# Patient Record
Sex: MALE | Race: WHITE | NOT HISPANIC OR LATINO | URBAN - METROPOLITAN AREA
[De-identification: names, ages, dates, MRNs, and addresses within clinical notes are randomized per-mention and may not be internally consistent; named-entity substitution may affect disease eponyms.]

---

## 2018-02-02 ENCOUNTER — INPATIENT (INPATIENT)
Facility: HOSPITAL | Age: 83
LOS: 6 days | Discharge: NURSING, REHAB OR LONG-TERM CARE | End: 2018-02-09
Attending: INTERNAL MEDICINE | Admitting: INTERNAL MEDICINE
Payer: MEDICARE

## 2018-02-02 VITALS
TEMPERATURE: 98 F | DIASTOLIC BLOOD PRESSURE: 101 MMHG | RESPIRATION RATE: 16 BRPM | OXYGEN SATURATION: 98 % | WEIGHT: 160.06 LBS | HEIGHT: 66 IN | HEART RATE: 110 BPM | SYSTOLIC BLOOD PRESSURE: 161 MMHG

## 2018-02-02 DIAGNOSIS — J69.0 PNEUMONITIS DUE TO INHALATION OF FOOD AND VOMIT: ICD-10-CM

## 2018-02-02 DIAGNOSIS — Z98.49 CATARACT EXTRACTION STATUS, UNSPECIFIED EYE: Chronic | ICD-10-CM

## 2018-02-02 DIAGNOSIS — Z86.73 PERSONAL HISTORY OF TRANSIENT ISCHEMIC ATTACK (TIA), AND CEREBRAL INFARCTION WITHOUT RESIDUAL DEFICITS: ICD-10-CM

## 2018-02-02 DIAGNOSIS — R09.02 HYPOXEMIA: ICD-10-CM

## 2018-02-02 DIAGNOSIS — I95.9 HYPOTENSION, UNSPECIFIED: ICD-10-CM

## 2018-02-02 DIAGNOSIS — H26.9 UNSPECIFIED CATARACT: ICD-10-CM

## 2018-02-02 DIAGNOSIS — G62.9 POLYNEUROPATHY, UNSPECIFIED: ICD-10-CM

## 2018-02-02 DIAGNOSIS — J96.01 ACUTE RESPIRATORY FAILURE WITH HYPOXIA: ICD-10-CM

## 2018-02-02 DIAGNOSIS — R04.0 EPISTAXIS: ICD-10-CM

## 2018-02-02 DIAGNOSIS — R09.89 OTHER SPECIFIED SYMPTOMS AND SIGNS INVOLVING THE CIRCULATORY AND RESPIRATORY SYSTEMS: ICD-10-CM

## 2018-02-02 DIAGNOSIS — M48.00 SPINAL STENOSIS, SITE UNSPECIFIED: ICD-10-CM

## 2018-02-02 DIAGNOSIS — I48.2 CHRONIC ATRIAL FIBRILLATION: ICD-10-CM

## 2018-02-02 DIAGNOSIS — R06.82 TACHYPNEA, NOT ELSEWHERE CLASSIFIED: ICD-10-CM

## 2018-02-02 DIAGNOSIS — J18.9 PNEUMONIA, UNSPECIFIED ORGANISM: ICD-10-CM

## 2018-02-02 DIAGNOSIS — R06.00 DYSPNEA, UNSPECIFIED: ICD-10-CM

## 2018-02-02 DIAGNOSIS — Z79.01 LONG TERM (CURRENT) USE OF ANTICOAGULANTS: ICD-10-CM

## 2018-02-02 DIAGNOSIS — I10 ESSENTIAL (PRIMARY) HYPERTENSION: ICD-10-CM

## 2018-02-02 DIAGNOSIS — Z87.891 PERSONAL HISTORY OF NICOTINE DEPENDENCE: ICD-10-CM

## 2018-02-02 DIAGNOSIS — R78.89 FINDING OF OTHER SPECIFIED SUBSTANCES, NOT NORMALLY FOUND IN BLOOD: ICD-10-CM

## 2018-02-02 DIAGNOSIS — D68.9 COAGULATION DEFECT, UNSPECIFIED: ICD-10-CM

## 2018-02-02 LAB
ALBUMIN SERPL ELPH-MCNC: 3.4 G/DL — SIGNIFICANT CHANGE UP (ref 3.3–5)
ALP SERPL-CCNC: 89 U/L — SIGNIFICANT CHANGE UP (ref 40–120)
ALT FLD-CCNC: 17 U/L — SIGNIFICANT CHANGE UP (ref 12–78)
ANION GAP SERPL CALC-SCNC: 11 MMOL/L — SIGNIFICANT CHANGE UP (ref 5–17)
APTT BLD: 46.9 SEC — HIGH (ref 27.5–37.4)
AST SERPL-CCNC: 15 U/L — SIGNIFICANT CHANGE UP (ref 15–37)
BASE EXCESS BLDA CALC-SCNC: -2.3 MMOL/L — LOW (ref -2–2)
BASOPHILS # BLD AUTO: 0.07 K/UL — SIGNIFICANT CHANGE UP (ref 0–0.2)
BASOPHILS NFR BLD AUTO: 0.8 % — SIGNIFICANT CHANGE UP (ref 0–2)
BILIRUB SERPL-MCNC: 0.5 MG/DL — SIGNIFICANT CHANGE UP (ref 0.2–1.2)
BLD GP AB SCN SERPL QL: SIGNIFICANT CHANGE UP
BLOOD GAS COMMENTS: SIGNIFICANT CHANGE UP
BLOOD GAS COMMENTS: SIGNIFICANT CHANGE UP
BLOOD GAS SOURCE: SIGNIFICANT CHANGE UP
BUN SERPL-MCNC: 19 MG/DL — SIGNIFICANT CHANGE UP (ref 7–23)
CALCIUM SERPL-MCNC: 8.9 MG/DL — SIGNIFICANT CHANGE UP (ref 8.5–10.1)
CHLORIDE SERPL-SCNC: 103 MMOL/L — SIGNIFICANT CHANGE UP (ref 96–108)
CO2 SERPL-SCNC: 26 MMOL/L — SIGNIFICANT CHANGE UP (ref 22–31)
CREAT SERPL-MCNC: 1.31 MG/DL — HIGH (ref 0.5–1.3)
EOSINOPHIL # BLD AUTO: 0.19 K/UL — SIGNIFICANT CHANGE UP (ref 0–0.5)
EOSINOPHIL NFR BLD AUTO: 2.1 % — SIGNIFICANT CHANGE UP (ref 0–6)
FLUAV SPEC QL CULT: NEGATIVE — SIGNIFICANT CHANGE UP
FLUBV AG SPEC QL IA: NEGATIVE — SIGNIFICANT CHANGE UP
GLUCOSE SERPL-MCNC: 199 MG/DL — HIGH (ref 70–99)
GRAM STN FLD: SIGNIFICANT CHANGE UP
HCO3 BLDA-SCNC: 21 MMOL/L — SIGNIFICANT CHANGE UP (ref 21–29)
HCT VFR BLD CALC: 49.7 % — SIGNIFICANT CHANGE UP (ref 39–50)
HCT VFR BLD CALC: 52.5 % — HIGH (ref 39–50)
HGB BLD-MCNC: 16.1 G/DL — SIGNIFICANT CHANGE UP (ref 13–17)
HGB BLD-MCNC: 16.9 G/DL — SIGNIFICANT CHANGE UP (ref 13–17)
HOROWITZ INDEX BLDA+IHG-RTO: 100 — SIGNIFICANT CHANGE UP
IMM GRANULOCYTES NFR BLD AUTO: 0.5 % — SIGNIFICANT CHANGE UP (ref 0–1.5)
INR BLD: 3.96 RATIO — HIGH (ref 0.88–1.16)
INR BLD: 4 RATIO — HIGH (ref 0.88–1.16)
LEGIONELLA AG UR QL: NEGATIVE — SIGNIFICANT CHANGE UP
LYMPHOCYTES # BLD AUTO: 1.73 K/UL — SIGNIFICANT CHANGE UP (ref 1–3.3)
LYMPHOCYTES # BLD AUTO: 19 % — SIGNIFICANT CHANGE UP (ref 13–44)
MCHC RBC-ENTMCNC: 31 PG — SIGNIFICANT CHANGE UP (ref 27–34)
MCHC RBC-ENTMCNC: 31.1 PG — SIGNIFICANT CHANGE UP (ref 27–34)
MCHC RBC-ENTMCNC: 32.2 GM/DL — SIGNIFICANT CHANGE UP (ref 32–36)
MCHC RBC-ENTMCNC: 32.4 GM/DL — SIGNIFICANT CHANGE UP (ref 32–36)
MCV RBC AUTO: 96.1 FL — SIGNIFICANT CHANGE UP (ref 80–100)
MCV RBC AUTO: 96.2 FL — SIGNIFICANT CHANGE UP (ref 80–100)
MONOCYTES # BLD AUTO: 0.66 K/UL — SIGNIFICANT CHANGE UP (ref 0–0.9)
MONOCYTES NFR BLD AUTO: 7.2 % — SIGNIFICANT CHANGE UP (ref 2–14)
NEUTROPHILS # BLD AUTO: 6.41 K/UL — SIGNIFICANT CHANGE UP (ref 1.8–7.4)
NEUTROPHILS NFR BLD AUTO: 70.4 % — SIGNIFICANT CHANGE UP (ref 43–77)
NRBC # BLD: 0 /100 WBCS — SIGNIFICANT CHANGE UP (ref 0–0)
NRBC # BLD: 0 /100 WBCS — SIGNIFICANT CHANGE UP (ref 0–0)
PCO2 BLDA: 35 MMHG — SIGNIFICANT CHANGE UP (ref 32–46)
PH BLD: 7.4 — SIGNIFICANT CHANGE UP (ref 7.35–7.45)
PLATELET # BLD AUTO: 319 K/UL — SIGNIFICANT CHANGE UP (ref 150–400)
PLATELET # BLD AUTO: 373 K/UL — SIGNIFICANT CHANGE UP (ref 150–400)
PO2 BLDA: 68 MMHG — LOW (ref 74–108)
POTASSIUM SERPL-MCNC: 4.4 MMOL/L — SIGNIFICANT CHANGE UP (ref 3.5–5.3)
POTASSIUM SERPL-SCNC: 4.4 MMOL/L — SIGNIFICANT CHANGE UP (ref 3.5–5.3)
PROT SERPL-MCNC: 8.7 GM/DL — HIGH (ref 6–8.3)
PROTHROM AB SERPL-ACNC: 44.4 SEC — HIGH (ref 9.8–12.7)
PROTHROM AB SERPL-ACNC: 44.8 SEC — HIGH (ref 9.8–12.7)
RBC # BLD: 5.17 M/UL — SIGNIFICANT CHANGE UP (ref 4.2–5.8)
RBC # BLD: 5.46 M/UL — SIGNIFICANT CHANGE UP (ref 4.2–5.8)
RBC # FLD: 13 % — SIGNIFICANT CHANGE UP (ref 10.3–14.5)
RBC # FLD: 13.1 % — SIGNIFICANT CHANGE UP (ref 10.3–14.5)
SAO2 % BLDA: 92 % — SIGNIFICANT CHANGE UP (ref 92–96)
SODIUM SERPL-SCNC: 140 MMOL/L — SIGNIFICANT CHANGE UP (ref 135–145)
SPECIMEN SOURCE: SIGNIFICANT CHANGE UP
WBC # BLD: 11.53 K/UL — HIGH (ref 3.8–10.5)
WBC # BLD: 9.11 K/UL — SIGNIFICANT CHANGE UP (ref 3.8–10.5)
WBC # FLD AUTO: 11.53 K/UL — HIGH (ref 3.8–10.5)
WBC # FLD AUTO: 9.11 K/UL — SIGNIFICANT CHANGE UP (ref 3.8–10.5)

## 2018-02-02 PROCEDURE — 93010 ELECTROCARDIOGRAM REPORT: CPT

## 2018-02-02 PROCEDURE — 99285 EMERGENCY DEPT VISIT HI MDM: CPT

## 2018-02-02 PROCEDURE — 99291 CRITICAL CARE FIRST HOUR: CPT

## 2018-02-02 PROCEDURE — 71045 X-RAY EXAM CHEST 1 VIEW: CPT | Mod: 26

## 2018-02-02 RX ORDER — METOPROLOL TARTRATE 50 MG
25 TABLET ORAL ONCE
Qty: 0 | Refills: 0 | Status: COMPLETED | OUTPATIENT
Start: 2018-02-02 | End: 2018-02-02

## 2018-02-02 RX ORDER — PIPERACILLIN AND TAZOBACTAM 4; .5 G/20ML; G/20ML
3.38 INJECTION, POWDER, LYOPHILIZED, FOR SOLUTION INTRAVENOUS EVERY 8 HOURS
Qty: 0 | Refills: 0 | Status: DISCONTINUED | OUTPATIENT
Start: 2018-02-02 | End: 2018-02-03

## 2018-02-02 RX ORDER — AZITHROMYCIN 500 MG/1
500 TABLET, FILM COATED ORAL ONCE
Qty: 0 | Refills: 0 | Status: COMPLETED | OUTPATIENT
Start: 2018-02-02 | End: 2018-02-02

## 2018-02-02 RX ORDER — WARFARIN SODIUM 2.5 MG/1
1 TABLET ORAL
Qty: 0 | Refills: 0 | COMMUNITY

## 2018-02-02 RX ORDER — AZITHROMYCIN 500 MG/1
TABLET, FILM COATED ORAL
Qty: 0 | Refills: 0 | Status: DISCONTINUED | OUTPATIENT
Start: 2018-02-02 | End: 2018-02-09

## 2018-02-02 RX ORDER — PHYTONADIONE (VIT K1) 5 MG
2.5 TABLET ORAL ONCE
Qty: 0 | Refills: 0 | Status: COMPLETED | OUTPATIENT
Start: 2018-02-02 | End: 2018-02-02

## 2018-02-02 RX ORDER — SODIUM CHLORIDE 9 MG/ML
1000 INJECTION INTRAMUSCULAR; INTRAVENOUS; SUBCUTANEOUS ONCE
Qty: 0 | Refills: 0 | Status: COMPLETED | OUTPATIENT
Start: 2018-02-02 | End: 2018-02-02

## 2018-02-02 RX ORDER — AZITHROMYCIN 500 MG/1
500 TABLET, FILM COATED ORAL EVERY 24 HOURS
Qty: 0 | Refills: 0 | Status: DISCONTINUED | OUTPATIENT
Start: 2018-02-03 | End: 2018-02-09

## 2018-02-02 RX ADMIN — Medication 2.5 MILLIGRAM(S): at 15:54

## 2018-02-02 RX ADMIN — PIPERACILLIN AND TAZOBACTAM 12.5 GRAM(S): 4; .5 INJECTION, POWDER, LYOPHILIZED, FOR SOLUTION INTRAVENOUS at 13:52

## 2018-02-02 RX ADMIN — SODIUM CHLORIDE 1000 MILLILITER(S): 9 INJECTION INTRAMUSCULAR; INTRAVENOUS; SUBCUTANEOUS at 09:27

## 2018-02-02 RX ADMIN — PIPERACILLIN AND TAZOBACTAM 12.5 GRAM(S): 4; .5 INJECTION, POWDER, LYOPHILIZED, FOR SOLUTION INTRAVENOUS at 21:13

## 2018-02-02 RX ADMIN — AZITHROMYCIN 255 MILLIGRAM(S): 500 TABLET, FILM COATED ORAL at 12:17

## 2018-02-02 NOTE — H&P ADULT - FAMILY HISTORY
Father  Still living? No  Family history of rheumatic heart disease, Age at diagnosis: Age Unknown     Mother  Still living? No  Family history of cancer, Age at diagnosis: Age Unknown

## 2018-02-02 NOTE — H&P ADULT - PMH
Atrial fibrillation, unspecified type    Cataract, unspecified cataract type, unspecified laterality    CVA (cerebral vascular accident)    Hypertension    Neuropathy    Spinal stenosis

## 2018-02-02 NOTE — H&P ADULT - NSHPREVIEWOFSYSTEMS_GEN_ALL_CORE
CONSTITUTIONAL: No fever, no chills, no weight loss  EYES: No eye pain, visual disturbances, or discharge + cataracts  ENMT:  No difficulty hearing, + epistaxis  NECK: No pain or stiffness  PULMONARY: + productive cough x7 days worsening over last 2 days, + shortness of breath   CARDIOVASCULAR: No chest pain, palpitations, no dizziness  GASTROINTESTINAL: No abdominal or epigastric pain. No nausea, vomiting; No diarrhea. No melena or hematochezia.  GENITOURINARY: No dysuria, frequency, no hematuria  NEUROLOGICAL: No headaches, + gait imbalance at times + neuropathy  SKIN: No itching, burning, rashes, or lesions   MUSCULOSKELETAL: No joint pain or swelling; No muscle, back, or extremity pain  PSYCHIATRIC: No depression, anxiety

## 2018-02-02 NOTE — ED PROVIDER NOTE - OBJECTIVE STATEMENT
82 years old male by ems with family c/o right nose bleeding with woke him up at 5:15 am Pt sts he takes coumadin for hx of cva x 2 Pt denies headache, dizziness, blurred visions, light sensitivities, focal/distal weakness or numbness, cough, sob, chest pain, nausea, vomiting, fever, chills, abd pain, dysuria, hematuria, or abnormal stool color.

## 2018-02-02 NOTE — ED PROVIDER NOTE - PROGRESS NOTE DETAILS
Pt's pmd Dr. Duron is notified and sts call ICU Dr. Tripp icu attending is notified. Dr. Tripp icu attending is notified. repeat bp 129/79 hr 101 pox 96% in nonrebreather 100% FiO2 Dr. Tripp icu attending is here eval pt and accepts pt to icu. No ENT on call schedule Dr. Godoy is called and left message.

## 2018-02-02 NOTE — GOALS OF CARE CONVERSATION - PERSONAL ADVANCE DIRECTIVE - CONVERSATION DETAILS
D/W pt if he has an assigned HCP or if he has any advanced directives in place already. Pt states "I think so, I have paperwork at home and with Dr. Miguel at the office" Pt states he will ask his family to bring everything in.

## 2018-02-02 NOTE — H&P ADULT - HISTORY OF PRESENT ILLNESS
82M PMH a-fib s/p ablation on coumadin, CVA x2, HTN presents from home for profuse epistasis starting at 5AM. Pt reports SOB. Coughing up "large hunks of blood clots" all morning. 82M PMH a-fib s/p ablation on coumadin, CVA x2, HTN, cataract presents from home for profuse epistasis starting at 5AM. Pt reports SOB. Coughing up "large chunks of blood clots" all morning. No CP, no dizziness, no HA. States he had the "flu shot" 2 weeks ago and 2-3 days after flu vaccination he developed upper respiratory symptoms. Reports + productive cough with green sputum production 82M PMH a-fib s/p ablation on coumadin 4mg, CVA x2, HTN, cataract presents from home for profuse epistasis starting at 5AM. Pt reports SOB. Coughing up "large chunks of blood clots" all morning. No CP, no dizziness, no HA. States he had the "flu shot" 2 weeks ago and 2-3 days after flu vaccination he developed upper respiratory symptoms. Reports + productive cough with green sputum production the last 2 days with worsening SOB and dyspnea with exertion. Denies fever or chills. SBP 160s on arrival to ED and subsequently with SBP 70-80s (repeated). Given 1L NS bolus. Pt also hypoxic O2 sat 70% placed on 100% NRB. Right nares packed via rhino rocket in ED. Pt continues to cough large blood clots from posterior oral pharynx. Tachypneic RR 20s and becomes dyspneic at end of sentences while speaking. Labs with INR 4. ABG done with significant hypoxia on 100% oxygen supplementation 7.40/35/68/21/92%

## 2018-02-02 NOTE — ED ADULT NURSE NOTE - OBJECTIVE STATEMENT
Patient alert and oriented, family at bedside. Patient having an acute nose bleed this morning, stating he is on coumadin.

## 2018-02-02 NOTE — H&P ADULT - NSHPSOCIALHISTORY_GEN_ALL_CORE
, lives at home with wife, has a daughter, occasional alcohol use (martini) once in a while, former smoker 1PPD quit 1995.    + flu vaccine - 2 weeks ago  + pneumococcal vaccine 2017

## 2018-02-02 NOTE — H&P ADULT - ASSESSMENT
82M PMH a-fib s/p ablation on coumadin 4mg, CVA x2, HTN, cataract presents from home for profuse epistasis starting at 5AM. With reports of SOB. Found to be coagulopathic in setting of elevated INR from coumadin, hypoxia requiring 100% FIO2 supplementation, and hypotension. ROS with URI symptoms. Admit to ICU for airway monitoring, hypoxia, PNA, right epistasis, coagulopathy, and hypotension.    1. ENT  - s/p right nasal packing  - ENT eval called  - no evidence of further bleeding at present time  - Hb stable  - doubt pt with actual hemoptysis, however epistaxis with dripping of blood posteriorly leading to coughing of large amounts of clots    2. PULM  - cont oxygen supplementation  - attempted to wean off NRB mask to 6L NC however pt hypoxic to 83%  - cont to wean off NRB as tolerates  - humidify oxygen  - cover for aspiration PNA vs community acquired penumonia  - start zosyn  - azithromycin, can d/c if urine legionella negative  - follow up official CXR results  - check viral nasal swab r/o flu or viral etiology of URI symptoms     3. CV  - hypotensive in ED  - BP responded to 1L NS bolus  - monitor blood pressure  - hypotension may be from a vagal response in rhino rocket insertion   - no significant drop in Hb to indicate hypovolemia from massive blood loss    4. HEME  - monitor CBC for H/H trend  - elevated INR 4 on admission  - with large amount of epistasis with supra therapeutic INR will give a dose of vit K. if profuse bleeding continues will give Kcentra as further worsening epistasis leading to patency of airway is of concern.       5. GEN  - full code at present time  - d/w patient if has HCP and any advanced directives, he says everything is at home and his PMD may have all [facility] paperwork for it.

## 2018-02-02 NOTE — CONSULT NOTE ADULT - SUBJECTIVE AND OBJECTIVE BOX
KNOWN TO ME  ADMITTED FOR EPISTAXIS REQUIRING PACKING  HISTORY ATRIAL FIBRILLATION STATUS  POST ABLATION; ON ANTICOAGULATION; PRIOR CVAS  WILL FOLLOW AS NEEDED     DK FITCH MD, FACC;

## 2018-02-02 NOTE — H&P ADULT - NSHPLABSRESULTS_GEN_ALL_CORE
CBC Full  -  ( 02 Feb 2018 12:58 )  WBC Count : 11.53 K/uL  Hemoglobin : 16.1 g/dL  Hematocrit : 49.7 %  Platelet Count - Automated : 319 K/uL  Mean Cell Volume : 96.1 fl  Mean Cell Hemoglobin : 31.1 pg  Mean Cell Hemoglobin Concentration : 32.4 gm/dL        02-02    140  |  103  |  19  ----------------------------<  199<H>  4.4   |  26  |  1.31<H>    Ca    8.9      02 Feb 2018 08:37    TPro  8.7<H>  /  Alb  3.4  /  TBili  0.5  /  AST  15  /  ALT  17  /  AlkPhos  89  02-02    LIVER FUNCTIONS - ( 02 Feb 2018 08:37 )  Alb: 3.4 g/dL / Pro: 8.7 gm/dL / ALK PHOS: 89 U/L / ALT: 17 U/L / AST: 15 U/L    Prothrombin Time and INR, Plasma (02.02.18 @ 08:37)    Prothrombin Time, Plasma: 44.8 sec    INR: 4.00 ratio      PT/INR - ( 02 Feb 2018 12:58 )   PT: 44.4 sec;   INR: 3.96 ratio    PTT - ( 02 Feb 2018 08:37 )  PTT:46.9 sec      ##Imaging  CXR (my read): mild interstitial markings, ?faint opacity LLL and RLL

## 2018-02-03 LAB
ANION GAP SERPL CALC-SCNC: 8 MMOL/L — SIGNIFICANT CHANGE UP (ref 5–17)
BUN SERPL-MCNC: 18 MG/DL — SIGNIFICANT CHANGE UP (ref 7–23)
CALCIUM SERPL-MCNC: 8.6 MG/DL — SIGNIFICANT CHANGE UP (ref 8.5–10.1)
CHLORIDE SERPL-SCNC: 106 MMOL/L — SIGNIFICANT CHANGE UP (ref 96–108)
CO2 SERPL-SCNC: 26 MMOL/L — SIGNIFICANT CHANGE UP (ref 22–31)
CREAT SERPL-MCNC: 1.03 MG/DL — SIGNIFICANT CHANGE UP (ref 0.5–1.3)
GLUCOSE SERPL-MCNC: 102 MG/DL — HIGH (ref 70–99)
HCT VFR BLD CALC: 48.5 % — SIGNIFICANT CHANGE UP (ref 39–50)
HGB BLD-MCNC: 16 G/DL — SIGNIFICANT CHANGE UP (ref 13–17)
INR BLD: 2.21 RATIO — HIGH (ref 0.88–1.16)
MAGNESIUM SERPL-MCNC: 2.3 MG/DL — SIGNIFICANT CHANGE UP (ref 1.6–2.6)
MCHC RBC-ENTMCNC: 31.7 PG — SIGNIFICANT CHANGE UP (ref 27–34)
MCHC RBC-ENTMCNC: 33 GM/DL — SIGNIFICANT CHANGE UP (ref 32–36)
MCV RBC AUTO: 96 FL — SIGNIFICANT CHANGE UP (ref 80–100)
NRBC # BLD: 0 /100 WBCS — SIGNIFICANT CHANGE UP (ref 0–0)
PHOSPHATE SERPL-MCNC: 3.2 MG/DL — SIGNIFICANT CHANGE UP (ref 2.5–4.5)
PLATELET # BLD AUTO: 302 K/UL — SIGNIFICANT CHANGE UP (ref 150–400)
POTASSIUM SERPL-MCNC: 4.6 MMOL/L — SIGNIFICANT CHANGE UP (ref 3.5–5.3)
POTASSIUM SERPL-SCNC: 4.6 MMOL/L — SIGNIFICANT CHANGE UP (ref 3.5–5.3)
PROTHROM AB SERPL-ACNC: 24.5 SEC — HIGH (ref 9.8–12.7)
RBC # BLD: 5.05 M/UL — SIGNIFICANT CHANGE UP (ref 4.2–5.8)
RBC # FLD: 13.1 % — SIGNIFICANT CHANGE UP (ref 10.3–14.5)
SODIUM SERPL-SCNC: 140 MMOL/L — SIGNIFICANT CHANGE UP (ref 135–145)
WBC # BLD: 11.35 K/UL — HIGH (ref 3.8–10.5)
WBC # FLD AUTO: 11.35 K/UL — HIGH (ref 3.8–10.5)

## 2018-02-03 PROCEDURE — 93306 TTE W/DOPPLER COMPLETE: CPT | Mod: 26

## 2018-02-03 RX ORDER — METOPROLOL TARTRATE 50 MG
TABLET ORAL
Qty: 0 | Refills: 0 | Status: DISCONTINUED | OUTPATIENT
Start: 2018-02-03 | End: 2018-02-09

## 2018-02-03 RX ORDER — METOPROLOL TARTRATE 50 MG
25 TABLET ORAL
Qty: 0 | Refills: 0 | Status: DISCONTINUED | OUTPATIENT
Start: 2018-02-03 | End: 2018-02-09

## 2018-02-03 RX ORDER — CEFTRIAXONE 500 MG/1
INJECTION, POWDER, FOR SOLUTION INTRAMUSCULAR; INTRAVENOUS
Qty: 0 | Refills: 0 | Status: DISCONTINUED | OUTPATIENT
Start: 2018-02-03 | End: 2018-02-09

## 2018-02-03 RX ORDER — CEFTRIAXONE 500 MG/1
1 INJECTION, POWDER, FOR SOLUTION INTRAMUSCULAR; INTRAVENOUS ONCE
Qty: 0 | Refills: 0 | Status: COMPLETED | OUTPATIENT
Start: 2018-02-03 | End: 2018-02-03

## 2018-02-03 RX ORDER — METOPROLOL TARTRATE 50 MG
25 TABLET ORAL ONCE
Qty: 0 | Refills: 0 | Status: COMPLETED | OUTPATIENT
Start: 2018-02-03 | End: 2018-02-03

## 2018-02-03 RX ORDER — CEFTRIAXONE 500 MG/1
1 INJECTION, POWDER, FOR SOLUTION INTRAMUSCULAR; INTRAVENOUS EVERY 24 HOURS
Qty: 0 | Refills: 0 | Status: DISCONTINUED | OUTPATIENT
Start: 2018-02-04 | End: 2018-02-09

## 2018-02-03 RX ADMIN — CEFTRIAXONE 100 GRAM(S): 500 INJECTION, POWDER, FOR SOLUTION INTRAMUSCULAR; INTRAVENOUS at 11:00

## 2018-02-03 RX ADMIN — Medication 25 MILLIGRAM(S): at 17:33

## 2018-02-03 RX ADMIN — AZITHROMYCIN 255 MILLIGRAM(S): 500 TABLET, FILM COATED ORAL at 12:00

## 2018-02-03 RX ADMIN — PIPERACILLIN AND TAZOBACTAM 12.5 GRAM(S): 4; .5 INJECTION, POWDER, LYOPHILIZED, FOR SOLUTION INTRAVENOUS at 05:26

## 2018-02-03 NOTE — PROGRESS NOTE ADULT - SUBJECTIVE AND OBJECTIVE BOX
CC: Epistaxis    ## HPI:  83M PMH a-fib (s/p ablation on OAC), H/O CVA x 2 p/w significant epistaxis for several hours a/w dyspnea and coughing up blood with clots.  Per patient had URI symptoms for several days a/w cough productive of greenish sputum.  In ED, found to be hypertensive then hypotensive, hypoxemic and tachypneic.   Pertient labs included INR 4, AB.40/35/68/21 BE-2 on NRB.    O/N:  Remains on ventimask / supplemental oxygen    ## ROS:  CONSTITUTIONAL: No fever, chills  EYES: No eye pain, visual disturbances  ENMT:  No difficulty hearing, No sinus or throat pain  RESPIRATORY: No cough, wheezing, hemoptysis; No shortness of breath  CARDIOVASCULAR: No chest pain, palpitations  GASTROINTESTINAL: No abdominal or epigastric pain. No nausea, vomiting, or hematemesis; No diarrhea. No melena or hematochezia.  GENITOURINARY: No dysuria, frequency, hematuria  NEUROLOGICAL: No headaches  ENDOCRINE: No heat or cold intolerance  MUSCULOSKELETAL: No joint pain or swelling; No muscle, back, or extremity pain    ## Labs:  CBC:             16.0   11.35 )-----------( 302      ( 2018 04:43 )             48.5     Hemoglobin:  16.0 g/dL (18 @ 04:43)  16.1 g/dL (18 @ 12:58)  16.9 g/dL (18 @ 08:37)    Chem:     140  |  106  |  18  ----------------------------<  102<H>  4.6   |  26  |  1.03    Ca    8.6      2018 04:43  Phos  3.2     -  Mg     2.3     -    TPro  8.7<H>  /  Alb  3.4  /  TBili  0.5  /  DBili  x   /  AST  15  /  ALT  17  /  AlkPhos  89  02-    Coags:  PT/INR - ( 2018 04:43 )   PT: 24.5 sec;   INR: 2.21 ratio    PTT - ( 2018 08:37 )  PTT:46.9 sec    Culture - Sputum (collected 2018 19:04): Normal Respiratory Poonam present    ## Imaging: No new imaging    ## Medications:  metoprolol tartrate 25 milliGRAM(s) Oral two times a day    azithromycin IVPB 500 milliGRAM(s) IV Intermittent every 24 hours  cefTRIAXone IVPB        ## O/E:  T(C): 35.8 (2018 16:06), Max: 37.3 (2018 23:00)  HR: 101 (2018 14:00) (86 - 109)  BP: 114/71 (2018 14:00) (91/53 - 152/92)  BP(mean): 81 (2018 14:00) (63 - 113)  RR: 26 (2018 14:00) (17 - 26)  SpO2: 92% (2018 14:00) (83% - 96%)  IN: 1240 mL / OUT: 1650 mL / NET: -410 mL    Gen: sitting up in bed in no apparent distress  HEENT: R nare with dried blood, packing in place  Resp: scattered rhonchi B/L  CVS: S1S2 no m/r/g  Abd: soft NT/ND +BS  Ext: no c/c/e  Neuro: A&Ox3    ## Daily Issues  - Analgesia: N/A  - Sedation: N/A  - HOB elevation: Y  - GI ppx (ulcers): N/A  - DVT ppx: SCD  - Nutrition: PO diet    ## Assessment / Plan:  83M with epistaxis  - Unclear etiology but in setting of supratherapeutic INR. Will need ENT evaluation.  - Hgb stable, INR downtrending after vitamin K x 1, symptoms improving.  - Still requires significant oxygen to maintain spO2 with signs of clinical pneumonia. Continue empiric antibiotics with ceftriaxone and azithromycin for 5 days total. Culture negative.  - Patient will remain in ICU for closer monitoring.    ## Code status:  Goals of care discussion: [x] yes [ ] no  [x] full code  [ ] DNR  [ ] DNI  [ ] MOLST

## 2018-02-03 NOTE — CONSULT NOTE ADULT - SUBJECTIVE AND OBJECTIVE BOX
History of Present Illness: 	  82M PMH a-fib s/p ablation on coumadin 4mg, CVA x2, HTN, cataract presents from home for profuse epistasis starting at 5AM. Pt reports SOB. Coughing up "large chunks of blood clots" all morning. No CP, no dizziness, no HA. States he had the "flu shot" 2 weeks ago and 2-3 days after flu vaccination he developed upper respiratory symptoms. Reports + productive cough with green sputum production the last 2 days with worsening SOB and dyspnea with exertion.  Right nares packed via rhino rocket in ED. no further bleeding x 24 hours.       Review of Systems:  Review of Systems: CONSTITUTIONAL: No fever, no chills, no weight loss  EYES: No eye pain, visual disturbances, or discharge + cataracts  ENMT:  No difficulty hearing, + epistaxis  NECK: No pain or stiffness  PULMONARY: + productive cough x7 days worsening over last 2 days, + shortness of breath   CARDIOVASCULAR: No chest pain, palpitations, no dizziness  GASTROINTESTINAL: No abdominal or epigastric pain. No nausea, vomiting; No diarrhea. No melena or hematochezia.  GENITOURINARY: No dysuria, frequency, no hematuria  NEUROLOGICAL: No headaches, + gait imbalance at times + neuropathy  SKIN: No itching, burning, rashes, or lesions   MUSCULOSKELETAL: No joint pain or swelling; No muscle, back, or extremity pain PSYCHIATRIC: No depression, anxiety	  Other Review of Systems: All other review of systems negative, except as noted in HPI 	      Allergies and Intolerances:        Allergies:  	No Known Allergies:     Home Medications:   * Patient Currently Takes Medications as of 02-Feb-2018 09:40 documented in Structured Notes  · 	metoprolol tartrate 25 mg oral tablet: 1 tab(s) orally 2 times a day, Last Dose Taken:    · 	Coumadin 2 mg oral tablet: 1 tab(s) orally once a day, Last Dose Taken:      Patient History:    Past Medical History:  Atrial fibrillation, unspecified type    Cataract, unspecified cataract type, unspecified laterality    CVA (cerebral vascular accident)    Hypertension    Neuropathy    Spinal stenosis.     Past Surgical History:  Status post cataract surgery.     Family History:  Father  Still living? No  Family history of rheumatic heart disease, Age at diagnosis: Age Unknown     Mother  Still living? No  Family history of cancer, Age at diagnosis: Age Unknown.     Social History:  Social History (marital status, living situation, occupation, tobacco use, alcohol and drug use, and sexual history): , lives at home with wife, has a daughter, occasional alcohol use (martini) once in a while, former smoker 1PPD quit 1995.   + flu vaccine - 2 weeks ago + pneumococcal vaccine 2017	     Tobacco Screening:  · Core Measure Site	Yes	  · Has the patient used tobacco in the past 30 days?	No 	    Risk Assessment:    Present on Admission:  Deep Venous Thrombosis	no 	  Pulmonary Embolus	no 	  Urinary Catheter	no 	  Central Venous Catheter/PICC Line	no 	  Surgical Site Incision	no 	  Pressure Ulcer(s)	no 	     Heart Failure:  Does this patient have a history of or has been diagnosed with heart failure? no.       Physical Exam:  Physical Exam: Vital Signs Last 24 Hrs T(C): 35.8 (03 Feb 2018 16:06), Max: 37.3 (02 Feb 2018 23:00) T(F): 96.5 (03 Feb 2018 16:06), Max: 99.1 (02 Feb 2018 23:00) HR: 95 (03 Feb 2018 17:00) (86 - 109) BP: 114/68 (03 Feb 2018 17:00) (91/53 - 152/92) BP(mean): 78 (03 Feb 2018 17:00) (63 - 113) RR: 23 (03 Feb 2018 17:00) (17 - 26) SpO2: 86% (03 Feb 2018 17:00) (83% - 96%)  GENERAL: NAD, well-groomed, well-developed  HEAD:  Atraumatic, Normocephalic  EYES: EOMI, pupils reactive, conjunctiva and sclera clear  ENMT: R nares packed with rhinorocket, no e/o active bleeding. posterior op clear   FOE: clear np to glottis, tvc mobile b/l  NECK: Supple, No JVD, Normal thyroid  NERVOUS SYSTEM:  Alert & Oriented X3, Good concentration; Motor Strength 5/5 B/L upper and lower extremities; DTRs 2+ intact and symmetric  CHEST/LUNG: no wheeze, scattered bilateral rhonchi, no rales  HEART: Regular rate and rhythm  ABDOMEN: Soft, Nontender, Nondistended; Bowel sounds present EXTREMITIES:  2+ Peripheral Pulses, No clubbing, cyanosis, or edema	       Labs and Results:  Labs, Radiology, Cardiology, and Other Results: CBC Full  -  ( 02 Feb 2018 12:58 )  WBC Count : 11.53 K/uL  Hemoglobin : 16.1 g/dL  Hematocrit : 49.7 %  Platelet Count - Automated : 319 K/uL  Mean Cell Volume : 96.1 fl  Mean Cell Hemoglobin : 31.1 pg  Mean Cell Hemoglobin Concentration : 32.4 gm/dL     02-02   140  |  103  |  19  ----------------------------<  199<H>  4.4   |  26  |  1.31<H>   Ca    8.9      02 Feb 2018 08:37   TPro  8.7<H>  /  Alb  3.4  /  TBili  0.5  /  AST  15  /  ALT  17  /  AlkPhos  89  02-02   LIVER FUNCTIONS - ( 02 Feb 2018 08:37 )  Alb: 3.4 g/dL / Pro: 8.7 gm/dL / ALK PHOS: 89 U/L / ALT: 17 U/L / AST: 15 U/L   Prothrombin Time and INR, Plasma (02.02.18 @ 08:37)    Prothrombin Time, Plasma: 44.8 sec    INR: 4.00 ratio    PT/INR - ( 02 Feb 2018 12:58 )   PT: 44.4 sec;   INR: 3.96 ratio    PTT - ( 02 Feb 2018 08:37 )  PTT:46.9 sec    ##Imaging CXR (my read): mild interstitial markings, ?faint opacity LLL and RLL	    Assessment and Plan:   82M PMH a-fib s/p ablation on coumadin 4mg, CVA x2, HTN, admitted for right epistaxis now s/p right RR placement. No further bleeding x 24 hours     -tomorrow PM, deflate the air in the RR balloon (but do not remove     -Monday ok to remove the RR is no further bleeding     -humidified O2 PRN

## 2018-02-04 LAB
ANION GAP SERPL CALC-SCNC: 7 MMOL/L — SIGNIFICANT CHANGE UP (ref 5–17)
APTT BLD: 35 SEC — SIGNIFICANT CHANGE UP (ref 27.5–37.4)
BUN SERPL-MCNC: 14 MG/DL — SIGNIFICANT CHANGE UP (ref 7–23)
CALCIUM SERPL-MCNC: 8.8 MG/DL — SIGNIFICANT CHANGE UP (ref 8.5–10.1)
CHLORIDE SERPL-SCNC: 103 MMOL/L — SIGNIFICANT CHANGE UP (ref 96–108)
CO2 SERPL-SCNC: 27 MMOL/L — SIGNIFICANT CHANGE UP (ref 22–31)
CREAT SERPL-MCNC: 0.91 MG/DL — SIGNIFICANT CHANGE UP (ref 0.5–1.3)
CULTURE RESULTS: SIGNIFICANT CHANGE UP
GLUCOSE SERPL-MCNC: 99 MG/DL — SIGNIFICANT CHANGE UP (ref 70–99)
GRAM STN FLD: SIGNIFICANT CHANGE UP
HCT VFR BLD CALC: 46.9 % — SIGNIFICANT CHANGE UP (ref 39–50)
HGB BLD-MCNC: 15.1 G/DL — SIGNIFICANT CHANGE UP (ref 13–17)
INR BLD: 1.39 RATIO — HIGH (ref 0.88–1.16)
MAGNESIUM SERPL-MCNC: 2.3 MG/DL — SIGNIFICANT CHANGE UP (ref 1.6–2.6)
MCHC RBC-ENTMCNC: 30.7 PG — SIGNIFICANT CHANGE UP (ref 27–34)
MCHC RBC-ENTMCNC: 32.2 GM/DL — SIGNIFICANT CHANGE UP (ref 32–36)
MCV RBC AUTO: 95.3 FL — SIGNIFICANT CHANGE UP (ref 80–100)
NRBC # BLD: 0 /100 WBCS — SIGNIFICANT CHANGE UP (ref 0–0)
PHOSPHATE SERPL-MCNC: 2.8 MG/DL — SIGNIFICANT CHANGE UP (ref 2.5–4.5)
PLATELET # BLD AUTO: 315 K/UL — SIGNIFICANT CHANGE UP (ref 150–400)
POTASSIUM SERPL-MCNC: 4.1 MMOL/L — SIGNIFICANT CHANGE UP (ref 3.5–5.3)
POTASSIUM SERPL-SCNC: 4.1 MMOL/L — SIGNIFICANT CHANGE UP (ref 3.5–5.3)
PROTHROM AB SERPL-ACNC: 15.3 SEC — HIGH (ref 9.8–12.7)
RBC # BLD: 4.92 M/UL — SIGNIFICANT CHANGE UP (ref 4.2–5.8)
RBC # FLD: 13.2 % — SIGNIFICANT CHANGE UP (ref 10.3–14.5)
SODIUM SERPL-SCNC: 137 MMOL/L — SIGNIFICANT CHANGE UP (ref 135–145)
SPECIMEN SOURCE: SIGNIFICANT CHANGE UP
WBC # BLD: 10.4 K/UL — SIGNIFICANT CHANGE UP (ref 3.8–10.5)
WBC # FLD AUTO: 10.4 K/UL — SIGNIFICANT CHANGE UP (ref 3.8–10.5)

## 2018-02-04 PROCEDURE — 71045 X-RAY EXAM CHEST 1 VIEW: CPT | Mod: 26

## 2018-02-04 RX ORDER — FUROSEMIDE 40 MG
40 TABLET ORAL ONCE
Qty: 0 | Refills: 0 | Status: COMPLETED | OUTPATIENT
Start: 2018-02-04 | End: 2018-02-04

## 2018-02-04 RX ORDER — FUROSEMIDE 40 MG
40 TABLET ORAL ONCE
Qty: 0 | Refills: 0 | Status: DISCONTINUED | OUTPATIENT
Start: 2018-02-04 | End: 2018-02-04

## 2018-02-04 RX ORDER — FUROSEMIDE 40 MG
20 TABLET ORAL ONCE
Qty: 0 | Refills: 0 | Status: DISCONTINUED | OUTPATIENT
Start: 2018-02-04 | End: 2018-02-04

## 2018-02-04 RX ADMIN — AZITHROMYCIN 255 MILLIGRAM(S): 500 TABLET, FILM COATED ORAL at 13:32

## 2018-02-04 RX ADMIN — Medication 40 MILLIGRAM(S): at 13:31

## 2018-02-04 RX ADMIN — Medication 25 MILLIGRAM(S): at 05:18

## 2018-02-04 RX ADMIN — CEFTRIAXONE 100 GRAM(S): 500 INJECTION, POWDER, FOR SOLUTION INTRAMUSCULAR; INTRAVENOUS at 08:21

## 2018-02-04 NOTE — PHYSICAL THERAPY INITIAL EVALUATION ADULT - PERTINENT HX OF CURRENT PROBLEM, REHAB EVAL
patient is an 82yo M admitted 2/2 with profuse epistaxis s/p rhino rocket, complicated by hypoxia and dyspnea and admitted to ICU.

## 2018-02-04 NOTE — PROGRESS NOTE ADULT - SUBJECTIVE AND OBJECTIVE BOX
CC: Epistaxis    ## HPI:  83M PMH a-fib (s/p ablation on OAC), H/O CVA x 2 p/w significant epistaxis for several hours a/w dyspnea and coughing up blood with clots.  Per patient had URI symptoms for several days a/w cough productive of greenish sputum.  In ED, found to be hypertensive then hypotensive, hypoxemic and tachypneic.   Pertient labs included INR 4, AB.40/35/68/21 BE-2 on NRB.    O/N:  Remains on ventimask / supplemental oxygen. ENT saw patient and recommend deflating rhinorocket today evening.    ## ROS:  CONSTITUTIONAL: No fever, chills  EYES: No eye pain, visual disturbances  ENMT:  No difficulty hearing, No sinus or throat pain  RESPIRATORY: No cough, wheezing, hemoptysis; No shortness of breath  CARDIOVASCULAR: No chest pain, palpitations  GASTROINTESTINAL: No abdominal or epigastric pain. No nausea, vomiting, or hematemesis; No diarrhea. No melena or hematochezia.  GENITOURINARY: No dysuria, frequency, hematuria  NEUROLOGICAL: No headaches  ENDOCRINE: No heat or cold intolerance  MUSCULOSKELETAL: No joint pain or swelling; No muscle, back, or extremity pain    ## Labs:  CBC:                        15.1   10.40 )-----------( 315      ( 2018 04:55 )             46.9       Chem:  -    137  |  103  |  14  ----------------------------<  99  4.1   |  27  |  0.91    Ca    8.8      2018 04:55  Phos  2.8     02-04  Mg     2.3     02-04    Coags:  PT/INR - ( 2018 04:55 )   PT: 15.3 sec;   INR: 1.39 ratio    PTT - ( 2018 04:55 )  PTT:35.0 sec    Culture - Sputum (collected 2018 19:04): Normal Respiratory Poonam present    ## Imaging: No new imaging    ## Medications:  metoprolol     tartrate 25 milliGRAM(s) Oral two times a day    azithromycin  IVPB 500 milliGRAM(s) IV Intermittent every 24 hours  cefTRIAXone   IVPB 1 Gram(s) IV Intermittent every 24 hours    ## O/E:  T(F): 97.8 (18 @ 15:36), Max: 98.3 (18 @ 19:45)  HR: 103 (18 @ 14:02) (82 - 108)  BP: 105/61 (18 @ 14:02) (93/60 - 134/98)  RR: 24 (18 @ 14:02) (14 - 28)  SpO2: 90% (18 @ 14:02) (86% - 98%)  IN: 1090 mL / OUT: 1325 mL / NET: -235 mL    Gen: sitting up in bed in no apparent distress  HEENT: R nare with packing in place  Resp: scattered rhonchi B/L  CVS: S1S2 no m/r/g  Abd: soft NT/ND +BS  Ext: no c/c/e  Neuro: A&Ox3    ## Daily Issues  - Analgesia: N/A  - Sedation: N/A  - HOB elevation: Y  - GI ppx (ulcers): N/A  - DVT ppx: SCD  - Nutrition: PO diet    ## Assessment / Plan:  83M with epistaxis  - Unclear etiology but in setting of supratherapeutic INR. ENT following, recommend deflating rhino-rocket today and pulling on Monday if no bleeding.  - Hgb stable, INR downtrending after vitamin K x 1 (currently 1.39), symptoms improving.  - Still requires significant oxygen to maintain spO2 with signs of clinical pneumonia. Continue empiric antibiotics with ceftriaxone and azithromycin for 5 days total. Culture negative.  - POCU/S showed extensive b-lines anteriorly and confluent at bases, no pleural effusions. Good contractility. Will attempt diuresis to see if oxygen requirements improve.  - Patient will remain in ICU for closer monitoring after RR has been pulled out.    ## Code status:  Goals of care discussion: [x] yes [ ] no  [x] full code  [ ] DNR  [ ] DNI  [ ] MOLST

## 2018-02-04 NOTE — CHART NOTE - NSCHARTNOTEFT_GEN_A_CORE
Deflated deflated the air in the RR balloon w/o complications. Pt tolerated procedure well. To be d/c'd tomorrow if no further bleeding.

## 2018-02-04 NOTE — DIETITIAN INITIAL EVALUATION ADULT. - PERTINENT LABORATORY DATA
02-04 Na137 mmol/L Glu 99 mg/dL K+ 4.1 mmol/L Cr  0.91 mg/dL BUN 14 mg/dL Phos 2.8 mg/dL Alb n/a   PAB n/a

## 2018-02-04 NOTE — PHYSICAL THERAPY INITIAL EVALUATION ADULT - PLANNED THERAPY INTERVENTIONS, PT EVAL
neuromuscular re-education/transfer training/bed mobility training/gait training/postural re-education/strengthening/balance training

## 2018-02-04 NOTE — PHYSICAL THERAPY INITIAL EVALUATION ADULT - IMPAIRMENTS CONTRIBUTING TO GAIT DEVIATIONS, PT EVAL
impaired balance/decreased flexibility/impaired postural control/decreased strength/endurance decreased

## 2018-02-04 NOTE — PHYSICAL THERAPY INITIAL EVALUATION ADULT - ADDITIONAL COMMENTS
Pt lives with his wife in a private home, 3 entry steps (+rail). Prior to admission pt was independent with all functional mobility including community ambulation. Pt reports balance deficits due to past CVA. Pt states he does not use device for household ambulation but does endorse using a straight cane for community ambulation. Pt is right hand dominant & wears eye glasses for reading. 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 Pt drives & is retired.

## 2018-02-04 NOTE — DIETITIAN INITIAL EVALUATION ADULT. - NS AS NUTRI INTERV ED CONTENT
Dash/TLC; Heart Healthy cooking & shopping tips; weight loss tips; Vit K and medications/Survival information/Purpose of the nutrition education

## 2018-02-04 NOTE — PROGRESS NOTE ADULT - SUBJECTIVE AND OBJECTIVE BOX
TELEMETRY: NORMAL SINUS RHYTHM  HEMODYNAMICALLY STABLE   HGB 15  STABLE CARDIOVASCULAR STATUS; CONTINUING WITH PRESENT MANAGEMENT.

## 2018-02-04 NOTE — PHYSICAL THERAPY INITIAL EVALUATION ADULT - CRITERIA FOR SKILLED THERAPEUTIC INTERVENTIONS
therapy frequency/impairments found/rehab potential/predicted duration of therapy intervention/functional limitations in following categories/risk reduction/prevention/anticipated discharge recommendation

## 2018-02-04 NOTE — PHYSICAL THERAPY INITIAL EVALUATION ADULT - GENERAL OBSERVATIONS, REHAB EVAL
patient received semi supine in bed in NAD, +CCU monitoring, +IV, +O2 via face mask, family at bedside. pt is A&Ox3, agreeable to PT eval.

## 2018-02-04 NOTE — DIETITIAN INITIAL EVALUATION ADULT. - OTHER INFO
Pt seen today due to CCU admission. He lives at home with his wife. They both do the food shopping but he does the cooking watching his salt/fat intake. Denies food allergies. Last BM on 2/2. Watches food intake of Vit K. Consuming 85% of Dash/TLC diet. Has no c/o at this time.

## 2018-02-05 LAB
ALBUMIN SERPL ELPH-MCNC: 2.9 G/DL — LOW (ref 3.3–5)
ALBUMIN SERPL ELPH-MCNC: 3.1 G/DL — LOW (ref 3.3–5)
ALP SERPL-CCNC: 74 U/L — SIGNIFICANT CHANGE UP (ref 40–120)
ALP SERPL-CCNC: 78 U/L — SIGNIFICANT CHANGE UP (ref 40–120)
ALT FLD-CCNC: 17 U/L — SIGNIFICANT CHANGE UP (ref 12–78)
ALT FLD-CCNC: 19 U/L — SIGNIFICANT CHANGE UP (ref 12–78)
ANION GAP SERPL CALC-SCNC: 5 MMOL/L — SIGNIFICANT CHANGE UP (ref 5–17)
ANION GAP SERPL CALC-SCNC: 7 MMOL/L — SIGNIFICANT CHANGE UP (ref 5–17)
AST SERPL-CCNC: 26 U/L — SIGNIFICANT CHANGE UP (ref 15–37)
AST SERPL-CCNC: 72 U/L — HIGH (ref 15–37)
BILIRUB SERPL-MCNC: 0.6 MG/DL — SIGNIFICANT CHANGE UP (ref 0.2–1.2)
BILIRUB SERPL-MCNC: 0.7 MG/DL — SIGNIFICANT CHANGE UP (ref 0.2–1.2)
BUN SERPL-MCNC: 18 MG/DL — SIGNIFICANT CHANGE UP (ref 7–23)
BUN SERPL-MCNC: 20 MG/DL — SIGNIFICANT CHANGE UP (ref 7–23)
CALCIUM SERPL-MCNC: 8.7 MG/DL — SIGNIFICANT CHANGE UP (ref 8.5–10.1)
CALCIUM SERPL-MCNC: 9 MG/DL — SIGNIFICANT CHANGE UP (ref 8.5–10.1)
CHLORIDE SERPL-SCNC: 102 MMOL/L — SIGNIFICANT CHANGE UP (ref 96–108)
CHLORIDE SERPL-SCNC: 102 MMOL/L — SIGNIFICANT CHANGE UP (ref 96–108)
CO2 SERPL-SCNC: 26 MMOL/L — SIGNIFICANT CHANGE UP (ref 22–31)
CO2 SERPL-SCNC: 30 MMOL/L — SIGNIFICANT CHANGE UP (ref 22–31)
CREAT SERPL-MCNC: 1 MG/DL — SIGNIFICANT CHANGE UP (ref 0.5–1.3)
CREAT SERPL-MCNC: 1.07 MG/DL — SIGNIFICANT CHANGE UP (ref 0.5–1.3)
GLUCOSE SERPL-MCNC: 84 MG/DL — SIGNIFICANT CHANGE UP (ref 70–99)
GLUCOSE SERPL-MCNC: 96 MG/DL — SIGNIFICANT CHANGE UP (ref 70–99)
HCT VFR BLD CALC: 47.2 % — SIGNIFICANT CHANGE UP (ref 39–50)
HGB BLD-MCNC: 15.1 G/DL — SIGNIFICANT CHANGE UP (ref 13–17)
MAGNESIUM SERPL-MCNC: 2.1 MG/DL — SIGNIFICANT CHANGE UP (ref 1.6–2.6)
MCHC RBC-ENTMCNC: 30.6 PG — SIGNIFICANT CHANGE UP (ref 27–34)
MCHC RBC-ENTMCNC: 32 GM/DL — SIGNIFICANT CHANGE UP (ref 32–36)
MCV RBC AUTO: 95.5 FL — SIGNIFICANT CHANGE UP (ref 80–100)
NRBC # BLD: 0 /100 WBCS — SIGNIFICANT CHANGE UP (ref 0–0)
PHOSPHATE SERPL-MCNC: 3.8 MG/DL — SIGNIFICANT CHANGE UP (ref 2.5–4.5)
PLATELET # BLD AUTO: 302 K/UL — SIGNIFICANT CHANGE UP (ref 150–400)
POTASSIUM SERPL-MCNC: 4.2 MMOL/L — SIGNIFICANT CHANGE UP (ref 3.5–5.3)
POTASSIUM SERPL-MCNC: SIGNIFICANT CHANGE UP MMOL/L (ref 3.5–5.3)
POTASSIUM SERPL-SCNC: 4.2 MMOL/L — SIGNIFICANT CHANGE UP (ref 3.5–5.3)
POTASSIUM SERPL-SCNC: SIGNIFICANT CHANGE UP MMOL/L (ref 3.5–5.3)
PROT SERPL-MCNC: 7.6 GM/DL — SIGNIFICANT CHANGE UP (ref 6–8.3)
PROT SERPL-MCNC: 7.8 GM/DL — SIGNIFICANT CHANGE UP (ref 6–8.3)
RBC # BLD: 4.94 M/UL — SIGNIFICANT CHANGE UP (ref 4.2–5.8)
RBC # FLD: 13.1 % — SIGNIFICANT CHANGE UP (ref 10.3–14.5)
SODIUM SERPL-SCNC: 135 MMOL/L — SIGNIFICANT CHANGE UP (ref 135–145)
SODIUM SERPL-SCNC: 137 MMOL/L — SIGNIFICANT CHANGE UP (ref 135–145)
WBC # BLD: 9.5 K/UL — SIGNIFICANT CHANGE UP (ref 3.8–10.5)
WBC # FLD AUTO: 9.5 K/UL — SIGNIFICANT CHANGE UP (ref 3.8–10.5)

## 2018-02-05 RX ORDER — METOPROLOL TARTRATE 50 MG
1 TABLET ORAL
Qty: 0 | Refills: 0 | COMMUNITY

## 2018-02-05 RX ORDER — WARFARIN SODIUM 2.5 MG/1
2 TABLET ORAL
Qty: 0 | Refills: 0 | COMMUNITY

## 2018-02-05 RX ORDER — POTASSIUM CHLORIDE 20 MEQ
10 PACKET (EA) ORAL ONCE
Qty: 0 | Refills: 0 | Status: DISCONTINUED | OUTPATIENT
Start: 2018-02-05 | End: 2018-02-05

## 2018-02-05 RX ORDER — POTASSIUM CHLORIDE 20 MEQ
40 PACKET (EA) ORAL EVERY 4 HOURS
Qty: 0 | Refills: 0 | Status: DISCONTINUED | OUTPATIENT
Start: 2018-02-05 | End: 2018-02-05

## 2018-02-05 RX ADMIN — AZITHROMYCIN 255 MILLIGRAM(S): 500 TABLET, FILM COATED ORAL at 12:36

## 2018-02-05 RX ADMIN — Medication 25 MILLIGRAM(S): at 18:06

## 2018-02-05 RX ADMIN — CEFTRIAXONE 100 GRAM(S): 500 INJECTION, POWDER, FOR SOLUTION INTRAMUSCULAR; INTRAVENOUS at 08:01

## 2018-02-05 RX ADMIN — Medication 25 MILLIGRAM(S): at 06:36

## 2018-02-05 NOTE — OCCUPATIONAL THERAPY INITIAL EVALUATION ADULT - GENERAL OBSERVATIONS, REHAB EVAL
Pt was encountered supine in bed; NAD, cardiac monitor on, pulse ox on, ventimask on, BP cuff on LUE, AXOX4, cooperative, followed commands.

## 2018-02-05 NOTE — OCCUPATIONAL THERAPY INITIAL EVALUATION ADULT - PERTINENT HX OF CURRENT PROBLEM, REHAB EVAL
Patient admitted to [facility] due to s/p rhino rocket which evolved into epistaxis and dyspnea.

## 2018-02-05 NOTE — CHART NOTE - NSCHARTNOTEFT_GEN_A_CORE
Right nasal packing (rhino rocket deflated since yesterday). No observable bleeding.     Nasal packing removed without incident. No acute epistaxis noted.     O2 sat stable on oxygen supplementation via ventimask. Cont to wean down FIo2 as tolerates.

## 2018-02-05 NOTE — OCCUPATIONAL THERAPY INITIAL EVALUATION ADULT - MODIFIED CLINICAL TEST OF SENSORY INTEGRATION IN BALANCE TEST
abdomen Barthel Index: Feeding Score___10___, Bathing Score___0___, Grooming Score__5___, Dressing Score__10___, Bowel Score__10___, Bladder Score___10___, Toilet Score___10__, Transfer Score___10___, Mobility Score__0___, Stairs Score___0__, Total Score__65__.

## 2018-02-05 NOTE — PROGRESS NOTE ADULT - SUBJECTIVE AND OBJECTIVE BOX
TRANSFERED TO FLOOR  PACKING REMOVED  FEELING BETTER  CLEAR LUNGS  SOFT S1  ABDOMEN SOFT, NONTENDER, NO DISTENSION  NO CLUBBING CYANOSIS OR EDEMA    STABLE CARDIOVASCULAR STATUS; CONTINUING WITH PRESENT MANAGEMENT. WILL PLAN ON OUTPATIENT STRESS TEST/CV EVALUATION   DISCUSSED WITH DR OGNZALEZ AND FAMILY AT BEDSIDE    DOES PATIENT STILL NEED TO BE ON ANTICOAGULATION IN VIEW OF PREVIOUS AFLUTTER ABLATION?    DK FITCH MD, FACC

## 2018-02-05 NOTE — OCCUPATIONAL THERAPY INITIAL EVALUATION ADULT - ADDITIONAL COMMENTS
Patient lives in a private house with 4 steps to enter with a railing. Once inside, the patient bedroom and bathroom is on that level when entering. The patients bathroom has a tub/shower combination with a regular toilet and no devices or equipment inside. The patient ambulates with no device inside of the home, but does use a cane for community ambulation. The patient also owns a rolling walker.

## 2018-02-05 NOTE — CHART NOTE - NSCHARTNOTEFT_GEN_A_CORE
Pt medically stable for transfer to medical floor.  Call out made to Dr. Miguel's office.    83M PMH a-fib (s/p ablation on OAC), H/O CVA x 2 p/w significant epistaxis for several hours a/w dyspnea and coughing up blood with clots.  Per patient had URI symptoms for several days a/w cough productive of greenish sputum.  In ED, found to be hypertensive then hypotensive, hypoxemic and tachypneic. Pt transferred to ICU for closer monitoring.  Pt seen by ENT.  Pt has not any further episodes of bleeding at this time. Will plan to dc rhino rocket today.  Will defer restarting anticoagulation to cardiology and primary team.    Will plan to continue iv abx for clinical pneumonia. Pt medically stable for transfer to medical floor.  Call out made to Dr. Miguel's office.    83M PMH a-fib (s/p ablation on OAC), H/O CVA x 2 p/w significant epistaxis for several hours a/w dyspnea and coughing up blood with clots.  Per patient had URI symptoms for several days a/w cough productive of greenish sputum.  In ED, found to be hypertensive then hypotensive, hypoxemic and tachypneic. Pt transferred to ICU for closer monitoring.  Pt seen by ENT.  Pt has not any further episodes of bleeding at this time. Will plan to dc rhino rocket today.  Will defer restarting anticoagulation to cardiology and primary team.    Will plan to continue iv abx for clinical pneumonia.    Lopressor has not been restarted as BP has been relatively stable and HR controlled.

## 2018-02-05 NOTE — PROGRESS NOTE ADULT - SUBJECTIVE AND OBJECTIVE BOX
# CC: Epistaxis    ## HPI:  83M PMH a-fib (s/p ablation on OAC), H/O CVA x 2 p/w significant epistaxis for several hours a/w dyspnea and coughing up blood with clots.  Per patient had URI symptoms for several days a/w cough productive of greenish sputum.  In ED, found to be hypertensive then hypotensive, hypoxemic and tachypneic. Pertient labs included INR 4, AB.40/35/68/21 BE-2 on NRB.    O/N:  Remains on ventimask. RR deflated yesterday evening, no additional episodes of bleeding.    ## ROS:  CONSTITUTIONAL: No fever, chills  EYES: No eye pain, visual disturbances  ENMT:  No difficulty hearing, No sinus or throat pain  RESPIRATORY: No cough, wheezing, hemoptysis; No shortness of breath  CARDIOVASCULAR: No chest pain, palpitations  GASTROINTESTINAL: No abdominal or epigastric pain. No nausea, vomiting, or hematemesis; No diarrhea. No melena or hematochezia.  GENITOURINARY: No dysuria, frequency, hematuria  NEUROLOGICAL: No headaches  ENDOCRINE: No heat or cold intolerance  MUSCULOSKELETAL: No joint pain or swelling; No muscle, back, or extremity pain    ## Labs:  CBC:                        15.1   9.50  )-----------( 302      ( 2018 08:39 )             47.2       Chem: 02-    137  |  102  |  18  ----------------------------<  84  4.2   |  30  |  1.00    Ca    8.7      2018 08:39  Phos  3.8     02-  Mg     2.1     -    TPro  7.6  /  Alb  3.1<L>  /  TBili  0.6  /  DBili  x   /  AST  26  /  ALT  17  /  AlkPhos  74  02-05    Coags:  PT/INR - ( 2018 04:55 )   PT: 15.3 sec;   INR: 1.39 ratio    PTT - ( 2018 04:55 )  PTT:35.0 sec    Culture - Sputum (collected 2018 19:04): Normal Respiratory Poonam present    ## Imaging: No new imaging    ## Medications:  metoprolol     tartrate 25 milliGRAM(s) Oral two times a day    azithromycin  IVPB 500 milliGRAM(s) IV Intermittent every 24 hours  cefTRIAXone   IVPB 1 Gram(s) IV Intermittent every 24 hours    ## O/E:  T(F): 98.6 (18 @ 08:00), Max: 99.3 (18 @ 19:13)  HR: 85 (18 @ 11:00) (85 - 106)  BP: 101/63 (18 @ 11:00) (94/72 - 123/72)  RR: 20 (18 @ 11:00) (14 - 27)  SpO2: 95% (18 @ 11:00) (85% - 98%)  IN: 680 mL / OUT: 1600 mL / NET: -920 mL    Gen: sitting up in bed in no apparent distress  HEENT: R nare with packing in place  Resp: scattered rhonchi B/L  CVS: S1S2 no m/r/g  Abd: soft NT/ND +BS  Ext: no c/c/e  Neuro: A&Ox3    ## Daily Issues  - Analgesia: N/A  - Sedation: N/A  - HOB elevation: Y  - GI ppx (ulcers): N/A  - DVT ppx: SCD  - Nutrition: PO diet    ## Assessment / Plan:  83M with epistaxis  - Unclear etiology but in setting of supratherapeutic INR. RR deflated yesterday, can be pulled by 1600 today. No additional episodes of bleeding.  - Hgb stable, INR downtrending after vitamin K x 1 (currently 1.39), symptoms improving.  - Still requires oxygen to maintain spO2 with signs of clinical pneumonia. Continue empiric antibiotics with ceftriaxone and azithromycin for 5 days total. Culture negative.  - Diuresed net negative, now on 40% VM.    ## Code status:  Goals of care discussion: [x] yes [ ] no  [x] full code  [ ] DNR  [ ] DNI  [ ] MOLST    Patient can be transferred to general medicine.

## 2018-02-06 DIAGNOSIS — R06.00 DYSPNEA, UNSPECIFIED: ICD-10-CM

## 2018-02-06 DIAGNOSIS — I48.91 UNSPECIFIED ATRIAL FIBRILLATION: ICD-10-CM

## 2018-02-06 LAB — NT-PROBNP SERPL-SCNC: 407 PG/ML — SIGNIFICANT CHANGE UP (ref 0–450)

## 2018-02-06 PROCEDURE — 93010 ELECTROCARDIOGRAM REPORT: CPT

## 2018-02-06 PROCEDURE — 71045 X-RAY EXAM CHEST 1 VIEW: CPT | Mod: 26

## 2018-02-06 RX ADMIN — AZITHROMYCIN 255 MILLIGRAM(S): 500 TABLET, FILM COATED ORAL at 12:29

## 2018-02-06 RX ADMIN — Medication 25 MILLIGRAM(S): at 18:31

## 2018-02-06 RX ADMIN — CEFTRIAXONE 100 GRAM(S): 500 INJECTION, POWDER, FOR SOLUTION INTRAMUSCULAR; INTRAVENOUS at 08:50

## 2018-02-06 RX ADMIN — Medication 25 MILLIGRAM(S): at 06:50

## 2018-02-06 NOTE — PROGRESS NOTE ADULT - SUBJECTIVE AND OBJECTIVE BOX
STILL REQUIRES OXYGEN  CHEST XRAY: WITH PATCHY BIBASILAR OPACITIES AND ELEVATED HEMIDIAGPHRAGM; NO CHANGE  VOIDING  COARSE BREATH SOUNDS  DISTANT HEART SOUNDS  ABDOMEN SOFT, NONTENDER, NO DISTENSION  NO CLUBBING CYANOSIS OR EDEMA    WILL OBTAIN ECHO  FOLLOW UP ECG  PRN DIURETIC  RECOMMENDATIONS  PENDING HOSPITAL COURSE     DK FITCH MD, FACC STILL REQUIRES OXYGEN  CHEST XRAY: WITH PATCHY BIBASILAR OPACITIES AND ELEVATED HEMIDIAGPHRAGM; NO CHANGE  VOIDING  COARSE BREATH SOUNDS  DISTANT HEART SOUNDS  ABDOMEN SOFT, NONTENDER, NO DISTENSION  NO CLUBBING CYANOSIS OR EDEMA    WILL OBTAIN ECHO  CHECK BNP  FOLLOW UP ECG  PRN DIURETIC  RECOMMENDATIONS  PENDING HOSPITAL COURSE     DK FITCH MD, FACC

## 2018-02-07 PROCEDURE — 93010 ELECTROCARDIOGRAM REPORT: CPT

## 2018-02-07 RX ADMIN — CEFTRIAXONE 100 GRAM(S): 500 INJECTION, POWDER, FOR SOLUTION INTRAMUSCULAR; INTRAVENOUS at 08:46

## 2018-02-07 RX ADMIN — Medication 25 MILLIGRAM(S): at 06:55

## 2018-02-07 RX ADMIN — Medication 25 MILLIGRAM(S): at 19:04

## 2018-02-07 RX ADMIN — AZITHROMYCIN 255 MILLIGRAM(S): 500 TABLET, FILM COATED ORAL at 11:02

## 2018-02-07 NOTE — PROGRESS NOTE ADULT - PROVIDER SPECIALTY LIST ADULT
Cardiology
Critical Care
Critical Care
Internal Medicine
Internal Medicine
Critical Care
Internal Medicine

## 2018-02-07 NOTE — PROGRESS NOTE ADULT - RESPIRATORY
Breath Sounds equal & clear to percussion & auscultation, no accessory muscle use
Breath Sounds equal & clear to percussion & auscultation, no accessory muscle use
detailed exam

## 2018-02-07 NOTE — PROGRESS NOTE ADULT - SUBJECTIVE AND OBJECTIVE BOX
ECHO NORMAL LV EF  BNP WNL  NO CHANGE IN ECGS  CLEAR LUNGS  SOFT S1  NO EDEMA    CONFERED WITH DR GONZALEZ; I SUSPECT XRAY FINDINGS SECONDARY TO EPISTAXIS  WILL RE CHECK ROOM AIR SATURATION  WILL PLAN ON OUTPATIENT STRESS TEST  FAMILY PRESENT AT BEDSIDE     DK FITCH MD, FACC

## 2018-02-08 ENCOUNTER — TRANSCRIPTION ENCOUNTER (OUTPATIENT)
Age: 83
End: 2018-02-08

## 2018-02-08 RX ADMIN — CEFTRIAXONE 100 GRAM(S): 500 INJECTION, POWDER, FOR SOLUTION INTRAMUSCULAR; INTRAVENOUS at 07:59

## 2018-02-08 RX ADMIN — Medication 25 MILLIGRAM(S): at 17:46

## 2018-02-08 RX ADMIN — Medication 25 MILLIGRAM(S): at 05:38

## 2018-02-08 RX ADMIN — AZITHROMYCIN 255 MILLIGRAM(S): 500 TABLET, FILM COATED ORAL at 12:30

## 2018-02-08 NOTE — DISCHARGE NOTE ADULT - CARE PLAN
Principal Discharge DX:	Atrial fibrillation, unspecified type  Goal:	Cont with diet  Assessment and plan of treatment:	Follow up with Cardio  Secondary Diagnosis:	Epistaxis

## 2018-02-08 NOTE — DISCHARGE NOTE ADULT - MEDICATION SUMMARY - MEDICATIONS TO TAKE
I will START or STAY ON the medications listed below when I get home from the hospital:    Coumadin 2 mg oral tablet  -- 2 tab(s) by mouth once a day  -- Indication: For Atrial fibrillation, unspecified type    metoprolol tartrate 25 mg oral tablet  -- 1 tab(s) by mouth 3 times a day  -- Indication: For Atrial fibrillation, unspecified type

## 2018-02-08 NOTE — DISCHARGE NOTE ADULT - PATIENT PORTAL LINK FT
You can access the [facility] Portal, offered by [facility], by registering with the following website: [email or web address]

## 2018-02-08 NOTE — DISCHARGE NOTE ADULT - HOSPITAL COURSE
Patient presented with acute epistaxis controlled with packing. Was found to be very hypoxic requiring high conc O2. Patient spent 2 days in CCU and transfered to floor after packing was removed. Now on RA doing well. Patient presented with acute epistaxis controlled with packing. Was found to be very hypoxic requiring high conc O2. Patient spent 2 days in CCU and transfered to floor after packing was removed. Now on RA doing well. Patient now stable for discharge to rehab with 4L nasal canula. Patient presented with acute epistaxis controlled with packing. Was found to be very hypoxic requiring high conc O2. Patient spent 2 days in CCU and transfered to floor after packing was removed. Now on 4 l/m doing well. Patient now stable for discharge to rehab with 4L nasal canula. The patient was admitted with acute hypoxic respiratory failure due to aspiration pneumonia. Chronic afib with AC, and Acute epistaxis.

## 2018-02-09 VITALS
DIASTOLIC BLOOD PRESSURE: 71 MMHG | RESPIRATION RATE: 17 BRPM | HEART RATE: 94 BPM | SYSTOLIC BLOOD PRESSURE: 107 MMHG | TEMPERATURE: 99 F | OXYGEN SATURATION: 94 %

## 2018-02-09 RX ADMIN — AZITHROMYCIN 255 MILLIGRAM(S): 500 TABLET, FILM COATED ORAL at 11:58

## 2018-02-09 RX ADMIN — Medication 25 MILLIGRAM(S): at 05:43

## 2018-02-09 RX ADMIN — CEFTRIAXONE 100 GRAM(S): 500 INJECTION, POWDER, FOR SOLUTION INTRAMUSCULAR; INTRAVENOUS at 09:11

## 2020-01-01 ENCOUNTER — TRANSCRIPTION ENCOUNTER (OUTPATIENT)
Age: 85
End: 2020-01-01

## 2020-01-01 ENCOUNTER — APPOINTMENT (OUTPATIENT)
Dept: HOME HEALTH SERVICES | Facility: HOME HEALTH | Age: 85
End: 2020-01-01
Payer: MEDICARE

## 2020-01-01 VITALS — HEART RATE: 76 BPM | RESPIRATION RATE: 20 BRPM | OXYGEN SATURATION: 80 %

## 2020-01-01 DIAGNOSIS — J84.9 INTERSTITIAL PULMONARY DISEASE, UNSPECIFIED: ICD-10-CM

## 2020-01-01 DIAGNOSIS — Z71.89 OTHER SPECIFIED COUNSELING: ICD-10-CM

## 2020-01-01 DIAGNOSIS — H40.9 UNSPECIFIED GLAUCOMA: ICD-10-CM

## 2020-01-01 DIAGNOSIS — R41.82 ALTERED MENTAL STATUS, UNSPECIFIED: ICD-10-CM

## 2020-01-01 DIAGNOSIS — I10 ESSENTIAL (PRIMARY) HYPERTENSION: ICD-10-CM

## 2020-01-01 DIAGNOSIS — I27.81 COR PULMONALE (CHRONIC): ICD-10-CM
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
## 2020-01-01 DIAGNOSIS — I27.29 OTHER SECONDARY PULMONARY HYPERTENSION: ICD-10-CM

## 2020-01-01 DIAGNOSIS — I48.91 UNSPECIFIED ATRIAL FIBRILLATION: ICD-10-CM

## 2020-01-01 DIAGNOSIS — I50.810 OTHER SECONDARY PULMONARY HYPERTENSION: ICD-10-CM

## 2020-01-01 DIAGNOSIS — Z87.891 PERSONAL HISTORY OF NICOTINE DEPENDENCE: ICD-10-CM

## 2020-01-01 DIAGNOSIS — R26.81 UNSTEADINESS ON FEET: ICD-10-CM

## 2020-01-01 DIAGNOSIS — J44.9 CHRONIC OBSTRUCTIVE PULMONARY DISEASE, UNSPECIFIED: ICD-10-CM

## 2020-01-01 DIAGNOSIS — I27.20 PULMONARY HYPERTENSION, UNSPECIFIED: ICD-10-CM

## 2020-01-01 DIAGNOSIS — K59.09 OTHER CONSTIPATION: ICD-10-CM

## 2020-01-01 DIAGNOSIS — I50.32 CHRONIC DIASTOLIC (CONGESTIVE) HEART FAILURE: ICD-10-CM

## 2020-01-01 DIAGNOSIS — Q21.1 ATRIAL SEPTAL DEFECT: ICD-10-CM

## 2020-01-01 LAB
25(OH)D3 SERPL-MCNC: 20.7 NG/ML
ALBUMIN SERPL ELPH-MCNC: 3.8 G/DL
ALBUMIN SERPL ELPH-MCNC: 4 G/DL
ALP BLD-CCNC: 72 U/L
ALP BLD-CCNC: 75 U/L
ALT SERPL-CCNC: 7 U/L
ALT SERPL-CCNC: 9 U/L
ANION GAP SERPL CALC-SCNC: 12 MMOL/L
ANION GAP SERPL CALC-SCNC: 17 MMOL/L
AST SERPL-CCNC: 16 U/L
AST SERPL-CCNC: 20 U/L
BASOPHILS # BLD AUTO: 0.04 K/UL
BASOPHILS # BLD AUTO: 0.05 K/UL
BASOPHILS NFR BLD AUTO: 0.4 %
BASOPHILS NFR BLD AUTO: 0.7 %
BILIRUB SERPL-MCNC: 0.3 MG/DL
BILIRUB SERPL-MCNC: 0.4 MG/DL
BUN SERPL-MCNC: 19 MG/DL
BUN SERPL-MCNC: 19 MG/DL
CALCIUM SERPL-MCNC: 9.2 MG/DL
CALCIUM SERPL-MCNC: 9.4 MG/DL
CHLORIDE SERPL-SCNC: 102 MMOL/L
CHLORIDE SERPL-SCNC: 105 MMOL/L
CO2 SERPL-SCNC: 24 MMOL/L
CO2 SERPL-SCNC: 24 MMOL/L
CREAT SERPL-MCNC: 1.2 MG/DL
CREAT SERPL-MCNC: 1.27 MG/DL
EOSINOPHIL # BLD AUTO: 0.15 K/UL
EOSINOPHIL # BLD AUTO: 0.19 K/UL
EOSINOPHIL NFR BLD AUTO: 1.7 %
EOSINOPHIL NFR BLD AUTO: 2 %
HCT VFR BLD CALC: 47.5 %
HCT VFR BLD CALC: 49.3 %
HGB BLD-MCNC: 14 G/DL
HGB BLD-MCNC: 14.1 G/DL
IMM GRANULOCYTES NFR BLD AUTO: 0.4 %
IMM GRANULOCYTES NFR BLD AUTO: 0.5 %
INR PPP: 1.21 RATIO
INR PPP: 2.56 RATIO
LYMPHOCYTES # BLD AUTO: 1.02 K/UL
LYMPHOCYTES # BLD AUTO: 1.35 K/UL
LYMPHOCYTES NFR BLD AUTO: 18.2 %
LYMPHOCYTES NFR BLD AUTO: 9.2 %
MAN DIFF?: NORMAL
MAN DIFF?: NORMAL
MCHC RBC-ENTMCNC: 26.9 PG
MCHC RBC-ENTMCNC: 27 PG
MCHC RBC-ENTMCNC: 28.6 GM/DL
MCHC RBC-ENTMCNC: 29.5 GM/DL
MCV RBC AUTO: 91.5 FL
MCV RBC AUTO: 93.9 FL
MONOCYTES # BLD AUTO: 0.63 K/UL
MONOCYTES # BLD AUTO: 0.75 K/UL
MONOCYTES NFR BLD AUTO: 6.8 %
MONOCYTES NFR BLD AUTO: 8.5 %
NEUTROPHILS # BLD AUTO: 5.2 K/UL
NEUTROPHILS # BLD AUTO: 8.99 K/UL
NEUTROPHILS NFR BLD AUTO: 70.2 %
NEUTROPHILS NFR BLD AUTO: 81.4 %
PLATELET # BLD AUTO: 297 K/UL
PLATELET # BLD AUTO: 302 K/UL
POTASSIUM SERPL-SCNC: 4.4 MMOL/L
POTASSIUM SERPL-SCNC: 4.6 MMOL/L
PROT SERPL-MCNC: 7.1 G/DL
PROT SERPL-MCNC: 7.4 G/DL
PT BLD: 13.7 SEC
PT BLD: 30 SEC
RBC # BLD: 5.19 M/UL
RBC # BLD: 5.25 M/UL
RBC # FLD: 15 %
RBC # FLD: 15.3 %
SODIUM SERPL-SCNC: 141 MMOL/L
SODIUM SERPL-SCNC: 143 MMOL/L
TSH SERPL-ACNC: 3.17 UIU/ML
VIT B12 SERPL-MCNC: 241 PG/ML
VIT B12 SERPL-MCNC: 248 PG/ML
WBC # FLD AUTO: 11.05 K/UL
WBC # FLD AUTO: 7.41 K/UL

## 2020-01-01 PROCEDURE — G0506: CPT | Mod: 95

## 2020-01-01 PROCEDURE — 99345 HOME/RES VST NEW HIGH MDM 75: CPT | Mod: 25,95

## 2020-01-01 PROCEDURE — 99497 ADVNCD CARE PLAN 30 MIN: CPT | Mod: 95

## 2020-01-01 RX ORDER — SODIUM CHLORIDE 0.65 %
0.65 AEROSOL, SPRAY (ML) NASAL TWICE DAILY
Qty: 1 | Refills: 0 | Status: ACTIVE | COMMUNITY
Start: 2020-01-01

## 2020-01-01 RX ORDER — RISPERIDONE 0.25 MG/1
0.25 TABLET, FILM COATED ORAL
Qty: 60 | Refills: 0 | Status: ACTIVE | COMMUNITY
Start: 2020-01-01 | End: 1900-01-01

## 2020-01-01 RX ORDER — SILDENAFIL 20 MG/1
20 TABLET ORAL
Qty: 810 | Refills: 3 | Status: ACTIVE | COMMUNITY
Start: 2020-01-01 | End: 1900-01-01

## 2020-01-01 RX ORDER — DOCUSATE SODIUM 50 MG SENNOSIDES 8.6 MG 8.6; 5 MG/1; MG/1
8.6-5 TABLET, FILM COATED ORAL
Qty: 180 | Refills: 3 | Status: ACTIVE | COMMUNITY
Start: 2020-01-01

## 2020-01-01 RX ORDER — WARFARIN 2 MG/1
2 TABLET ORAL
Qty: 135 | Refills: 1 | Status: ACTIVE | COMMUNITY
Start: 2020-01-01 | End: 1900-01-01

## 2020-01-01 RX ORDER — ALBUTEROL SULFATE 90 UG/1
108 (90 BASE) INHALANT RESPIRATORY (INHALATION)
Qty: 1 | Refills: 1 | Status: ACTIVE | COMMUNITY
Start: 2020-01-01 | End: 1900-01-01

## 2020-01-01 RX ORDER — METOPROLOL TARTRATE 25 MG/1
25 TABLET, FILM COATED ORAL 3 TIMES DAILY
Qty: 270 | Refills: 3 | Status: ACTIVE | COMMUNITY
Start: 2020-01-01 | End: 1900-01-01

## 2020-01-01 RX ORDER — CIPROFLOXACIN HYDROCHLORIDE 250 MG/1
250 TABLET, FILM COATED ORAL
Qty: 14 | Refills: 0 | Status: ACTIVE | COMMUNITY
Start: 2020-01-01 | End: 1900-01-01

## 2020-01-01 RX ORDER — TRAVOPROST 0.04 MG/ML
0 SOLUTION OPHTHALMIC
Qty: 1 | Refills: 5 | Status: ACTIVE | COMMUNITY
Start: 2020-01-01 | End: 1900-01-01

## 2020-04-14 NOTE — OCCUPATIONAL THERAPY INITIAL EVALUATION ADULT - LIGHT TOUCH SENSATION, LUE, REHAB EVAL

## 2020-05-07 PROBLEM — I48.91 UNSPECIFIED ATRIAL FIBRILLATION: Chronic | Status: ACTIVE | Noted: 2018-02-02

## 2020-05-07 PROBLEM — M48.00 SPINAL STENOSIS, SITE UNSPECIFIED: Chronic | Status: ACTIVE | Noted: 2018-02-02

## 2020-05-07 PROBLEM — I10 ESSENTIAL (PRIMARY) HYPERTENSION: Chronic | Status: ACTIVE | Noted: 2018-02-02

## 2020-05-07 PROBLEM — Z71.89 ADVANCE CARE PLANNING: Status: ACTIVE | Noted: 2020-01-01

## 2020-05-07 PROBLEM — Q21.1 PATENT FORAMEN OVALE: Status: ACTIVE | Noted: 2020-01-01

## 2020-05-07 PROBLEM — I27.20 MODERATE TO SEVERE PULMONARY HYPERTENSION: Status: ACTIVE | Noted: 2020-01-01

## 2020-05-07 PROBLEM — J84.9 INTERSTITIAL LUNG DISEASE: Status: ACTIVE | Noted: 2020-01-01

## 2020-05-07 PROBLEM — J44.9 COPD, SEVERE: Status: ACTIVE | Noted: 2020-01-01

## 2020-05-07 PROBLEM — H26.9 UNSPECIFIED CATARACT: Chronic | Status: ACTIVE | Noted: 2018-02-02

## 2020-05-07 PROBLEM — I63.9 CEREBRAL INFARCTION, UNSPECIFIED: Chronic | Status: ACTIVE | Noted: 2018-02-02

## 2020-05-07 PROBLEM — Z87.891 FORMER SMOKER: Status: ACTIVE | Noted: 2020-01-01

## 2020-05-07 PROBLEM — G62.9 POLYNEUROPATHY, UNSPECIFIED: Chronic | Status: ACTIVE | Noted: 2018-02-02

## 2020-05-07 PROBLEM — K59.09 CHRONIC CONSTIPATION: Status: ACTIVE | Noted: 2020-01-01

## 2020-05-07 PROBLEM — I27.29 RIGHT HEART FAILURE DUE TO PULMONARY HYPERTENSION: Status: ACTIVE | Noted: 2020-01-01

## 2020-05-07 PROBLEM — I27.81 COR PULMONALE (CHRONIC): Status: ACTIVE | Noted: 2020-01-01

## 2020-05-07 PROBLEM — I50.32 CHRONIC DIASTOLIC HEART FAILURE: Status: ACTIVE | Noted: 2020-01-01

## 2020-05-07 PROBLEM — H40.9 GLAUCOMA: Status: ACTIVE | Noted: 2020-01-01

## 2020-05-07 PROBLEM — R26.81 UNSTEADY GAIT: Status: ACTIVE | Noted: 2020-01-01

## 2020-05-07 NOTE — HISTORY OF PRESENT ILLNESS
[Patient] : patient [Family Member] : family member [FreeTextEntry2] : MINI ESTES is being seen for a visit provided via telehealth real-time audio visual technology.\par MINI ESTES was located at their home, [address] [place] [address]\[place] [address], at the time of the visit. \par The [facility] clinician, JULITA MAKI, was located remotely at their home in [place] at the time of the visit.\par The patient, MINI ESTES, and the [facility] clinician, JULITA MAKI, participated in the telehealth encounter. \par Other participants included: Carlos Estes (son), Krissy Estes (spouse)\par \par MINI ESTES (Feb  3 1935) or his representative consents to the use of telehealth. All questions related to telehealth answered.\par \par Mr. Estes is an 85 year old male with essential hypertension, h/o CVA x 2, interstitial lung disease, chronic diastolic heart failure, chronic obstructive pulmonary disease, cor pulmonale, patent foramen ovale on chronic O2 who is being seen for initial visit. \par \par Patient was recently discharged from hospice. He reports that over the past 6 months he has not had a change in any of his chronic medical conditions and feels about the same. Patient was in his usual state of health when in 2/2018 he developed  dyspnea on exertion. He was eventually diagnosed with COPD and Interstitial lung disease (has h/o 75 pack year history of tobacco use). Currently he is on continuous oxygen at 4 L and increases to 6 L on exertion. Reports that his baseline O2 is in the 80s daily. \par \par Patient has history of CVA x 2 over a decade ago. Reports peripheral neuropathy since then. He has an unsteady gait, no recent falls. Walks around  the house unassisted, but uses a cane when he is outside his home. He denies any chronic pain, headaches, lightheadedness, abdominal pain, urinary frequency/urgency/burning, skin rash, skin break down, chronic cough. Reports some irritability and sadness related to the loss of his independence in 2018.  [FreeTextEntry1] : unsteady gait, chronic obstructive pulmonary disease

## 2020-05-07 NOTE — CURRENT MEDS
[High Risk Medications Reviewed and Reconciled (Beers Criteria)] : high risk medications reviewed and reconciled [Medication and Allergies Reconciled] : medication and allergies reconciled [Reviewed patient reported medication adherence from Comprehensive Assessment] : Reviewed patient reported medication adherence from comprehensive assessment [Adherent to medications] : Patient is adherent to medications as prescribed

## 2020-05-07 NOTE — HEALTH RISK ASSESSMENT
[Yes] : The patient does have visual impairment [No falls in past year] : Patient reported no falls in the past year [HRA Reviewed] : Health risk assessment reviewed [TimeGetUpGo] : 15

## 2020-05-07 NOTE — REASON FOR VISIT
[Initial Annual Medicare Wellness Visit] : an initial annual Medicare wellness visit [Family Member] : family member [Pre-Visit Preparation] : pre-visit preparation was done [Intercurrent Specialty/Sub-specialty Visits] : the patient has no intercurrent specialty/sub-specialty visits [FreeTextEntry1] : interstitial lung disease, severe pulmonary hypertension, chronic diastolic heart failure, atrial fibrillation, patent foramen ovale, essential hypertension, chronic obstructive pulmonary disease

## 2020-05-07 NOTE — CHRONIC CARE ASSESSMENT
[Can not Exercise (Disability)] : Exercise: The patient can not exercise due to disability [None] : The patient does not exercise [Low Salt Diet] : low salt [General Adherence] : and is generally adherent [PPS Score: ____] : Palliative Performance Scale (PPS) Score: [unfilled]

## 2020-05-07 NOTE — COUNSELING
[Not Recommended] : Aspirin use not recommended due to overall prognosis [] : eye exam [ - New patient with 2 or more chronic conditions; CCM discussed and patient-centered care plan established] : New patient with 2 or more chronic conditions; CCM discussed and patient-centered care plan established [Maintain functional ability] : maintain functional ability [Improve exercise tolerance] : improve exercise tolerance [Patient/Caregiver has ___ understanding of disease process] : patient/caregiver has [unfilled] understanding of disease process [Completed Medical Orders for Life-Sustaining Treatment] : completed medical orders for life-sustaining treatment [Full Code] : Code Status: Full Code [Trial of Intubation] : Intubation: Trial of Intubation [Limited] : Treatment Guidelines: Limited [_____] : HCP: [unfilled] [[facility] Date: _____] [facility]last verification date: [unfilled]

## 2020-05-07 NOTE — PHYSICAL EXAM
[No Acute Distress] : no acute distress [Well Nourished] : well nourished [Well Developed] : well developed [Normal Voice/Communication] : normal voice communication [Normal Sclera/Conjunctiva] : normal sclera/conjunctiva [EOMI] : extra ocular movement intact [Normal Outer Ear/Nose] : the ears and nose were normal in appearance [Normal Oropharynx] : the oropharynx was normal [Supple] : the neck was supple [No Respiratory Distress] : no respiratory distress [No Accessory Muscle Use] : no accessory muscle use [No Edema] : there was no peripheral edema [No LE Varicosities] : there were no varicosital changes in the lower extremities [Soft] : abdomen soft [Not Distended] : not distended [No Joint Swelling] : no joint swelling seen [No Skin Lesions] : no skin lesions [No Clubbing, Cyanosis] : no clubbing  or cyanosis of the fingernails [No Rash] : no rash [Normal Affect] : the affect was normal [No Motor Deficits] : the motor exam was normal [Oriented x3] : oriented to person, place, and time [Normal Insight/Judgement] : insight and judgment were intact [Normal Mood] : the mood was normal [Foot Ulcers] : no foot ulcers

## 2020-05-11 PROBLEM — I48.91 ATRIAL FIBRILLATION: Status: ACTIVE | Noted: 2020-01-01

## 2020-05-28 PROBLEM — R41.82 ALTERED MENTAL STATUS: Status: ACTIVE | Noted: 2020-01-01

## 2020-05-31 ENCOUNTER — TRANSCRIPTION ENCOUNTER (OUTPATIENT)
Age: 85
End: 2020-05-31

## 2021-10-06 PROBLEM — I10 ESSENTIAL HYPERTENSION: Status: ACTIVE | Noted: 2020-01-01

## 2024-03-20 NOTE — H&P ADULT - NSHPPHYSICALEXAM_GEN_ALL_CORE
,[email or web address]
GENERAL: NAD, well-groomed, well-developed  HEAD:  Atraumatic, Normocephalic  EYES: EOMI, pupils reactive, conjunctiva and sclera clear  ENMT: old dried crusted blood noted in left nares and right nares. R nares packed with rhinorocket  NECK: Supple, No JVD, Normal thyroid  NERVOUS SYSTEM:  Alert & Oriented X3, Good concentration; Motor Strength 5/5 B/L upper and lower extremities; DTRs 2+ intact and symmetric  CHEST/LUNG: no wheeze, scattered bilateral rhonchi, no rales  HEART: Regular rate and rhythm  ABDOMEN: Soft, Nontender, Nondistended; Bowel sounds present  EXTREMITIES:  2+ Peripheral Pulses, No clubbing, cyanosis, or edema